# Patient Record
Sex: FEMALE | Race: BLACK OR AFRICAN AMERICAN | NOT HISPANIC OR LATINO | URBAN - METROPOLITAN AREA
[De-identification: names, ages, dates, MRNs, and addresses within clinical notes are randomized per-mention and may not be internally consistent; named-entity substitution may affect disease eponyms.]

---

## 2023-12-17 ENCOUNTER — HOSPITAL ENCOUNTER (EMERGENCY)
Facility: HOSPITAL | Age: 68
Discharge: HOME | End: 2023-12-17

## 2023-12-17 VITALS
BODY MASS INDEX: 25.61 KG/M2 | HEART RATE: 76 BPM | WEIGHT: 150 LBS | SYSTOLIC BLOOD PRESSURE: 153 MMHG | OXYGEN SATURATION: 98 % | HEIGHT: 64 IN | DIASTOLIC BLOOD PRESSURE: 98 MMHG | RESPIRATION RATE: 17 BRPM | TEMPERATURE: 97.5 F

## 2023-12-17 DIAGNOSIS — J02.9 ACUTE PHARYNGITIS, UNSPECIFIED ETIOLOGY: Primary | ICD-10-CM

## 2023-12-17 LAB
FLUAV RNA RESP QL NAA+PROBE: NOT DETECTED
FLUBV RNA RESP QL NAA+PROBE: NOT DETECTED
S PYO DNA THROAT QL NAA+PROBE: NOT DETECTED
SARS-COV-2 RNA RESP QL NAA+PROBE: NOT DETECTED

## 2023-12-17 PROCEDURE — 87636 SARSCOV2 & INF A&B AMP PRB: CPT | Performed by: PHYSICIAN ASSISTANT

## 2023-12-17 PROCEDURE — 2500000001 HC RX 250 WO HCPCS SELF ADMINISTERED DRUGS (ALT 637 FOR MEDICARE OP): Performed by: PHYSICIAN ASSISTANT

## 2023-12-17 PROCEDURE — 87651 STREP A DNA AMP PROBE: CPT | Performed by: PHYSICIAN ASSISTANT

## 2023-12-17 PROCEDURE — 99283 EMERGENCY DEPT VISIT LOW MDM: CPT

## 2023-12-17 RX ORDER — IBUPROFEN 600 MG/1
600 TABLET ORAL ONCE
Status: COMPLETED | OUTPATIENT
Start: 2023-12-17 | End: 2023-12-17

## 2023-12-17 RX ADMIN — IBUPROFEN 600 MG: 600 TABLET, FILM COATED ORAL at 21:13

## 2023-12-17 ASSESSMENT — PAIN - FUNCTIONAL ASSESSMENT: PAIN_FUNCTIONAL_ASSESSMENT: 0-10

## 2023-12-17 ASSESSMENT — PAIN SCALES - GENERAL: PAINLEVEL_OUTOF10: 8

## 2023-12-17 ASSESSMENT — COLUMBIA-SUICIDE SEVERITY RATING SCALE - C-SSRS
6. HAVE YOU EVER DONE ANYTHING, STARTED TO DO ANYTHING, OR PREPARED TO DO ANYTHING TO END YOUR LIFE?: NO
2. HAVE YOU ACTUALLY HAD ANY THOUGHTS OF KILLING YOURSELF?: NO
1. IN THE PAST MONTH, HAVE YOU WISHED YOU WERE DEAD OR WISHED YOU COULD GO TO SLEEP AND NOT WAKE UP?: NO

## 2023-12-18 NOTE — ED PROVIDER NOTES
HPI     CC: Sore Throat (Pt presents with a sore throat that began yesterday and has progressively gotten worse since yesterday. Pt states that she is able to tolerate her secretions and is able to speak in full, clear sentences. She reports she is concerned she is concerned with having strep. No further complaints at this time. Denies cp or sob. Denies n/v/c. )     HPI: Graciela Reed is a 68 y.o. female with a history of type 2 diabetes, hyperlipidemia, CAD, hypertension, VAL, anemia, and osteoarthritis presents with concern for sore throat.  Patient states that it started yesterday and has progressed.  She has tried salt water gargles and Tylenol with some relief, however then the pain resumes.  She states it is getting worse and worse today and she now has pain down the left side of her anterior neck.  She reports slight nasal congestion and feeling warm but also reports that she is going through menopause so she is unsure if this was true fever.  She denies having a cough or recent sick contacts but states that she works at a [employer] store and has seen lots of kids recently.  No other complaints.  Denies any surgeries to her neck or throat.    ROS: 10-point review of systems was performed and is otherwise negative except as noted in HPI.      Past Medical History: Noncontributory except per HPI     Past Surgical History: Noncontributory except per HPI     Family History: Reviewed and noncontributory     Social History:  Denies tobacco. Denies ETOH. Denies illicit drugs.      Allergies   Allergen Reactions    Ace Inhibitors Cough and Unknown     cough    cough   cough       Home Meds: No current outpatient medications     ED Triage Vitals [12/17/23 2027]   Temp Heart Rate Resp BP   36.4 °C (97.5 °F) 76 17 (!) 173/97      SpO2 Temp Source Heart Rate Source Patient Position   98 % Oral -- --      BP Location FiO2 (%)     -- --         Heart Rate:  [76]   Temp:  [36.4 °C (97.5 °F)]   Resp:  [17]   BP:  "(153-173)/(97-98)   Height:  [162.6 cm (5' 4\")]   Weight:  [68 kg (150 lb)]   SpO2:  [98 %]      Physical Exam:  Physical Exam  Constitutional:       General: She is not in acute distress.     Appearance: Normal appearance. She is not ill-appearing, toxic-appearing or diaphoretic.   HENT:      Head: Normocephalic and atraumatic.      Right Ear: Tympanic membrane and external ear normal.      Left Ear: Tympanic membrane and external ear normal.      Nose: Nose normal.      Mouth/Throat:      Mouth: Mucous membranes are moist.      Pharynx: Posterior oropharyngeal erythema present. No oropharyngeal exudate.   Eyes:      Extraocular Movements: Extraocular movements intact.      Conjunctiva/sclera: Conjunctivae normal.      Pupils: Pupils are equal, round, and reactive to light.   Cardiovascular:      Rate and Rhythm: Normal rate and regular rhythm.      Pulses: Normal pulses.   Pulmonary:      Effort: Pulmonary effort is normal. No respiratory distress.      Breath sounds: Normal breath sounds.   Abdominal:      General: Abdomen is flat.      Palpations: Abdomen is soft.      Tenderness: There is no abdominal tenderness.   Musculoskeletal:         General: Normal range of motion.      Cervical back: Normal range of motion and neck supple.   Skin:     General: Skin is warm and dry.      Capillary Refill: Capillary refill takes less than 2 seconds.   Neurological:      General: No focal deficit present.      Mental Status: She is alert and oriented to person, place, and time.   Psychiatric:         Mood and Affect: Mood normal.          Diagnostic Results        Labs Reviewed   GROUP A STREPTOCOCCUS, PCR - Normal       Result Value    Group A Strep PCR Not Detected     SARS-COV-2 AND INFLUENZA A/B PCR - Normal    Flu A Result Not Detected      Flu B Result Not Detected      Coronavirus 2019, PCR Not Detected      Narrative:     This assay has received FDA Emergency Use Authorization (EUA) and  is only authorized for the " duration of time that circumstances exist to justify the authorization of the emergency use of in vitro diagnostic tests for the detection of SARS-CoV-2 virus and/or diagnosis of COVID-19 infection under section 564(b)(1) of the Act, 21 U.S.C. 360bbb-3(b)(1). Testing for SARS-CoV-2 is only recommended for patients who meet current clinical and/or epidemiological criteria as defined by federal, state, or local public health directives. This assay is an in vitro diagnostic nucleic acid amplification test for the qualitative detection of SARS-CoV-2, Influenza A, and Influenza B from nasopharyngeal specimens and has been validated for use at . Negative results do not preclude COVID-19 infections or Influenza A/B infections, and should not be used as the sole basis for diagnosis, treatment, or other management decisions. If Influenza A/B and RSV PCR results are negative, testing for Parainfluenza virus, Adenovirus and Metapneumovirus is routinely performed for [facility] pediatric oncology and intensive care inpatients, and is available on other patients by placing an add-on request.          No orders to display                 Yadira Coma Scale Score: 15                  Procedure  Procedures    ED Course & MDM   Assessment/Plan:     Medications   ibuprofen tablet 600 mg (600 mg oral Given 12/17/23 2113)        ED Course as of 12/17/23 2150   Sun Dec 17, 2023   2134 Sars-CoV-2 and Influenza A/B PCR  negative [EP]   2134 Group A Streptococcus, PCR  negative [EP]      ED Course User Index  [EP] Edith Fairbanks PA-C         Diagnoses as of 12/17/23 2150   Acute pharyngitis, unspecified etiology       Medical Decision Making    Graciela Reed is a 68 y.o. female with a history of type 2 diabetes, hyperlipidemia, CAD, hypertension, VAL, anemia, and osteoarthritis  presents for sore throat. The patient is nontoxic-appearing and vital signs are normal other than mild hypertension of 173/97.   Will plan to recheck.  Blood pressure improved to 153/98.  Patient will also be taking her nighttime blood pressure medications when she goes home. Based on presentation, differential diagnosis includes flu, COVID, acute sinusitis, strep, or other viral infection.  Will obtain swabs for the above.  Swabs were negative.  Patient was given 600 mg of Motrin for pain control.  She was reassessed and pain had greatly improved in her throat.  No further workup indicated at this time.    Viral pharyngitis: Patient was educated on the test results.  We discussed that this is a viral illness that will likely resolve within 7 to 10 days.  We also discussed that sometimes there are false negative results this early in illness.  If her symptoms persist or worsen, she can take a home COVID test in the next few days to ensure this test is actually negative. We discussed that it is important to stay hydrated and maintain nutrition during this time to prevent dehydration.  Tylenol and Motrin are acceptable forms of treatment during this time as well as other symptomatic care with over-the-counter medications.  We specifically discussed that there are over-the-counter numbing sprays to the back of the throat if Motrin is not enough.  She can also continue to do her salt water gargles and warm tea.  We discussed that a secondary bacterial infection could occur and that if new fever occurs between 5 to 7 days or symptoms worsen, this could be bacterial and they should seek medical attention.  Gave strict return precautions including but not limited to worsening throat pain, difficulty swallowing or maintaining secretions, worsening fever, facial or neck swelling, chest pain, shortness of breath, nausea, vomiting, or signs of dehydration.  Patient was agreeable to this plan of care and felt comfortable returning home.      Disposition: Home    ED Prescriptions    None         Social Determinants Affecting Care: None    Edith Fairbanks,  [place]    This note was dictated by speech recognition. Minor errors in transcription may be present.     Edith Fairbanks PA-C  12/17/23 [id number]

## 2024-05-28 NOTE — Clinical Note
Graciela Rede was seen and treated in our emergency department on 12/17/2023.  She may return to work on 12/19/2023.       If you have any questions or concerns, please don't hesitate to call.      Edith Fairbanks PA-C Shift assessment completed. Routine vitals stable. Scheduled medications given. Patient is awake, alert and oriented. Respirations are easy and unlabored. Patient does not appear to be in distress, resting comfortably at this time. Call light within reach.